# Patient Record
(demographics unavailable — no encounter records)

---

## 2024-10-18 NOTE — HISTORY OF PRESENT ILLNESS
[FreeTextEntry1] : presents for evaluation for frequency 5/23  noted for Months, voiding @10 times a day and nocturia 1-2 times. Has some urgency but can hold. Notes occasional hesitancy and double voiding; FOS can be variable. rare dysuria and no blood.  No h/o UTIs or STIs. had blood work and urine: reviewed and all normal. ULS: normal kidneys and bladder.   voided 40cc peak FR 16cc/sec - flat nomogram PVR 0   10/24 - given alfuzosin which he took intermittently - did help with the frequency, hesitancy and FOS however had dizziness so not really taking. also recently diagnosed with bacterial colitis - waiting to see if has UC  voided 390 peak FR 14 but flat and delayed nomogram for peak

## 2025-05-02 NOTE — ASSESSMENT
[FreeTextEntry1] : This is a 24 y/o Male presents today for initial evaluation of bilateral inguinal hernias.   The pt with a PMHx of anxiety, GERD and no PSHx.  Patient reports worsening of discomfort over the last 1 month in right groin exacerbated by coughing, straining, sneezing, lifting. Patient reports expansion of bulging/swelling that is reducible and associated with no pain. The pt also noticed similar symptoms on the left groin. He also reports urinary frequency but is unsure if it is related to the inguinal hernia. Relief with self-reduction/low activity/reclining. Denies nausea, vomiting, fever, chills, pain out of proportion, chest pain, HA, dizziness. Denies constipation, difficulty with bowel movements or urination. Denies shortness of breath, HENDRIX, or difficulty breathing, patient can walk / climb 2 flights without stopping. Denies hx of blood clots or bleeding problems.   Imaging: U/S bilateral groin (04/2025): direct right inguinal hernia. Negative left inguinal hernia.    Exam significant for: +small bilateral inguinal hernia, reducible. R>L.   The nature and risks of hernia were discussed as were signs and symptoms of incarceration, obstruction and strangulation as possible risks of observation. A thorough preoperative education has been performed about the role and the different surgical options have been discussed with patient. Risks, benefits and alternatives have been discussed and patient verbalizes understanding. Laparoscopic/Robotic/Open repair of inguinal hernia/abdominal hernia was discussed with the patient at length. The risks, benefits, and alternatives of the types of repair as well as to the use of mesh were discussed and all questions answered. Risks of hernia repair include, but are not limited to infection, bleeding, recurrence and injury to adjacent structures and nerves. Advised patient about possible risk of future complications if hernia worsens or goes untreated.  -The patient was offered the options of robotic-assisted bilateral inguinal hernias repair with mesh versus conservative monitoring for six months. He prefers to manage the right inguinal hernia conservatively at this time.

## 2025-05-02 NOTE — PHYSICAL EXAM
[Normal Breath Sounds] : Normal breath sounds [Normal Heart Sounds] : normal heart sounds [No Rash or Lesion] : No rash or lesion [Alert] : alert [Calm] : calm [JVD] : no jugular venous distention  [de-identified] : normocephalic [de-identified] :  in no acute distress. Well- developed, well-nourished. [de-identified] : +small bilateral inguinal hernia, reducible. R>L. [de-identified] : soft, non-distended, non-tender, no rebound or guarding.  [de-identified] : normal range of motions

## 2025-05-02 NOTE — PLAN
[FreeTextEntry1] : Bilateral inguinal hernias, R>L, asymptomatic: - Obtain U/S reports, pt will send  - The patient prefers to monitor the bilateral inguinal hernias conservatively.  - Follow up in 6 months to re-evaluate any changes in the bilateral inguinal hernias and assess the need for surgical repair.  - Go to ED if pain is resistant to pain medication or if symptoms worsen (N/V/fever, chills)

## 2025-05-02 NOTE — HISTORY OF PRESENT ILLNESS
[de-identified] : This is a 24 y/o Male presents today for initial evaluation of bilateral inguinal hernias.   The pt with a PMHx of anxiety, GERD and no PSHx.  Patient reports worsening of discomfort over the last 1 month in right groin exacerbated by coughing, straining, sneezing, lifting. Patient reports expansion of bulging/swelling that is reducible and associated with no pain. The pt also noticed similar symptoms on the left groin. He also reports urinary frequency but is unsure if it is related to the inguinal hernia. Relief with self-reduction/low activity/reclining. Denies nausea, vomiting, fever, chills, pain out of proportion, chest pain, HA, dizziness. Denies constipation, difficulty with bowel movements or urination. Denies shortness of breath, HENDRIX, or difficulty breathing, patient can walk / climb 2 flights without stopping. Denies hx of blood clots or bleeding problems.

## 2025-05-02 NOTE — HISTORY OF PRESENT ILLNESS
[de-identified] : This is a 22 y/o Male presents today for initial evaluation of bilateral inguinal hernias.   The pt with a PMHx of anxiety, GERD and no PSHx.  Patient reports worsening of discomfort over the last 1 month in right groin exacerbated by coughing, straining, sneezing, lifting. Patient reports expansion of bulging/swelling that is reducible and associated with no pain. The pt also noticed similar symptoms on the left groin. He also reports urinary frequency but is unsure if it is related to the inguinal hernia. Relief with self-reduction/low activity/reclining. Denies nausea, vomiting, fever, chills, pain out of proportion, chest pain, HA, dizziness. Denies constipation, difficulty with bowel movements or urination. Denies shortness of breath, HENDRIX, or difficulty breathing, patient can walk / climb 2 flights without stopping. Denies hx of blood clots or bleeding problems.

## 2025-05-02 NOTE — PHYSICAL EXAM
[Normal Breath Sounds] : Normal breath sounds [Normal Heart Sounds] : normal heart sounds [No Rash or Lesion] : No rash or lesion [Alert] : alert [Calm] : calm [JVD] : no jugular venous distention  [de-identified] :  in no acute distress. Well- developed, well-nourished. [de-identified] : normocephalic [de-identified] : +small bilateral inguinal hernia, reducible. R>L. [de-identified] : soft, non-distended, non-tender, no rebound or guarding.  [de-identified] : normal range of motions

## 2025-05-02 NOTE — HISTORY OF PRESENT ILLNESS
[de-identified] : This is a 24 y/o Male presents today for initial evaluation of bilateral inguinal hernias.   The pt with a PMHx of anxiety, GERD and no PSHx.  Patient reports worsening of discomfort over the last 1 month in right groin exacerbated by coughing, straining, sneezing, lifting. Patient reports expansion of bulging/swelling that is reducible and associated with no pain. The pt also noticed similar symptoms on the left groin. He also reports urinary frequency but is unsure if it is related to the inguinal hernia. Relief with self-reduction/low activity/reclining. Denies nausea, vomiting, fever, chills, pain out of proportion, chest pain, HA, dizziness. Denies constipation, difficulty with bowel movements or urination. Denies shortness of breath, HENDRIX, or difficulty breathing, patient can walk / climb 2 flights without stopping. Denies hx of blood clots or bleeding problems.

## 2025-05-02 NOTE — ADDENDUM
[FreeTextEntry1] :  I, HE GIDEON, am scribing for and in the presence of Dr. Rodriguez for the following sections: HISTORY OF PRESENT ILLNESS, PAST MEDICAL HISTORY, PAST SURGICAL HISTORY, FAMILY/SOCIAL HISTORY, REVIEW OF SYSTEMS, VITAL SIGNS, PHYSICAL EXAM, DISPOSITION.Reviewed current treatment plan, including medications / surgical intervention / lifestyle modifications where indicated. Reviewed imaging, laboratory results, or other diagnostics as applicable. Addressed patient concerns, including potential complications, risk factors, or alternative treatment options as applicable. Provided detailed education on pre- or post-operative instructions,and expected outcome as applicable. Instructed pt to call office for questions or concerns. Instructed patient to schedule follow-up appointment as recommended. Referrals and testing ordered where indicated. Plan of care reviewed with patient, and questions answered. The total time spent with patient included more than 50% of the time dedicated to counseling and coordination of care.

## 2025-05-02 NOTE — PHYSICAL EXAM
[Normal Breath Sounds] : Normal breath sounds [Normal Heart Sounds] : normal heart sounds [No Rash or Lesion] : No rash or lesion [Alert] : alert [Calm] : calm [JVD] : no jugular venous distention  [de-identified] : normocephalic [de-identified] :  in no acute distress. Well- developed, well-nourished. [de-identified] : soft, non-distended, non-tender, no rebound or guarding.  [de-identified] : +small bilateral inguinal hernia, reducible. R>L. [de-identified] : normal range of motions